# Patient Record
(demographics unavailable — no encounter records)

---

## 2025-06-10 NOTE — HISTORY OF PRESENT ILLNESS
[Pain Location] : pain [4] : a current pain level of 4/10 [de-identified] : Patient Arian Alejandro 58-year-old Male presenting for an initial lumbar evaluation. Ref by Codey Lopez PCP. He has a history of chronic low back pain which has worsened in the in the last 1 month. He was evaluated by his PCP and referred to pain management and treated with lumbar epidural and PT. His symptoms have improved significantly since initial treatment. Pain is left sided and he describes paresthesia going down left leg. His symptoms are provoked by prolonged standing.

## 2025-06-10 NOTE — PHYSICAL EXAM
[de-identified] :  Physical Exam: General: patient is well developed, well nourished, in no acute distress, alert and oriented x 3. Mood and affect: normal Respiratory: no respiratory distress noted Skin: no scars over spine, skin intact, no erythema, no increased warmth Alignment: normal Gait: The patient is able to toe walk and heel walk without difficulty. The patient is able to tandem gait with difficulty. Palpation: no tenderness to palpation spine or paraspinal region Range of motion: Limited lumbar flexion Neurologic Exam: Motor: 5/5 Bilaterally L2-S1 There is no evidence of muscular atrophy in the lower extremities.  Sensory: Decreased left L4, remainder of Sensation to light touch is grossly intact in the lower extremities Reflexes: DTR are present and symmetric throughout, no clonus, plantar responses are flexor Hip Exam: No pain with internal or external rotation of hips bilaterally Special tests: Straight leg raise test negative. Cross straight leg test negative. HEIDI test negative Vascular: Examination of the peripheral vascular system demonstrates no evidence of congestion or edema. no evidence of lymphedema bilateral lower extremities, pulses are present and symmetric in both lower extremities.  [de-identified] : XR 06/10/2025 (my read): mild degenerative changes.   MRI Lumbar Spine [[facility]] 05/14/2025 my read: L3/4 extruded disc herniation inferiorly with severe compression left L4 traversing nerve root.

## 2025-06-10 NOTE — DISCUSSION/SUMMARY
[de-identified] : L3/4 herniated disc with radiculopathy improving significantly after 1 LILLY. no weakness on exam today -counseled on activity modifications -f/u with pain management if needs another LILLY though right now does not -f/u with me if develops worsening weakness/numbness/pain or if this does not resolve -all questions answered